# Patient Record
Sex: FEMALE | Race: WHITE | NOT HISPANIC OR LATINO
[De-identification: names, ages, dates, MRNs, and addresses within clinical notes are randomized per-mention and may not be internally consistent; named-entity substitution may affect disease eponyms.]

---

## 2020-05-23 ENCOUNTER — TRANSCRIPTION ENCOUNTER (OUTPATIENT)
Age: 80
End: 2020-05-23

## 2021-01-29 ENCOUNTER — APPOINTMENT (OUTPATIENT)
Dept: ORTHOPEDIC SURGERY | Facility: CLINIC | Age: 81
End: 2021-01-29
Payer: MEDICARE

## 2021-01-29 DIAGNOSIS — Z82.61 FAMILY HISTORY OF ARTHRITIS: ICD-10-CM

## 2021-01-29 DIAGNOSIS — Z87.39 PERSONAL HISTORY OF OTHER DISEASES OF THE MUSCULOSKELETAL SYSTEM AND CONNECTIVE TISSUE: ICD-10-CM

## 2021-01-29 DIAGNOSIS — Z86.69 PERSONAL HISTORY OF OTHER DISEASES OF THE NERVOUS SYSTEM AND SENSE ORGANS: ICD-10-CM

## 2021-01-29 DIAGNOSIS — Z82.62 FAMILY HISTORY OF OSTEOPOROSIS: ICD-10-CM

## 2021-01-29 DIAGNOSIS — Z83.511 FAMILY HISTORY OF GLAUCOMA: ICD-10-CM

## 2021-01-29 DIAGNOSIS — Z86.79 PERSONAL HISTORY OF OTHER DISEASES OF THE CIRCULATORY SYSTEM: ICD-10-CM

## 2021-01-29 DIAGNOSIS — Z86.39 PERSONAL HISTORY OF OTHER ENDOCRINE, NUTRITIONAL AND METABOLIC DISEASE: ICD-10-CM

## 2021-01-29 DIAGNOSIS — Z80.9 FAMILY HISTORY OF MALIGNANT NEOPLASM, UNSPECIFIED: ICD-10-CM

## 2021-01-29 PROCEDURE — 99204 OFFICE O/P NEW MOD 45 MIN: CPT

## 2021-01-29 PROCEDURE — 73030 X-RAY EXAM OF SHOULDER: CPT | Mod: 50

## 2021-01-29 RX ORDER — TIMOLOL MALEATE 2.5 MG/ML
0.25 SOLUTION OPHTHALMIC
Refills: 0 | Status: ACTIVE | COMMUNITY

## 2021-01-29 RX ORDER — TAFLUPROST 0.01 MG/ML
SOLUTION/ DROPS OPHTHALMIC
Refills: 0 | Status: ACTIVE | COMMUNITY

## 2021-01-29 RX ORDER — LOSARTAN POTASSIUM 25 MG/1
25 TABLET, FILM COATED ORAL
Refills: 0 | Status: ACTIVE | COMMUNITY

## 2021-01-29 RX ORDER — THYROID, PORCINE 120 MG/1
120 TABLET ORAL
Refills: 0 | Status: ACTIVE | COMMUNITY

## 2021-01-29 NOTE — REASON FOR VISIT
[Initial Visit] : an initial visit for [Shoulder Pain] : shoulder pain [Spouse] : spouse [Family Member] : family member

## 2021-02-04 NOTE — CONSULT LETTER
[Dear  ___] : Dear  [unfilled], [Courtesy Letter:] : I had the pleasure of seeing your patient, [unfilled], in my office today. [Please see my note below.] : Please see my note below. [Consult Closing:] : Thank you very much for allowing me to participate in the care of this patient.  If you have any questions, please do not hesitate to contact me. [Sincerely,] : Sincerely, [FreeTextEntry2] : German Ross MD [FreeTextEntry3] : Yeny Guzman MD\par Orthopedic Surgery\par Sports Medicine\par
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
## 2021-02-04 NOTE — ASSESSMENT
[FreeTextEntry1] : 80-year-old woman who has had issues with her shoulders for many years now associated with various injuries with chronic pain. She does have stiffness and limited motion of the shoulder but strength is relatively good. There is a complete Tear is seen on an MRI 5 years ago and a LEFT shoulder rotator cuff and partial tears on the RIGHT which may have progressed since that time.\par The LEFT shoulder does have a little more degenerative changes and more narrowing of the subacromial space/elevation of the humeral head.\par She would prefer not to have surgery and with his chronic tears of the rotator cuff may not be repairable at this point in time on the LEFT shoulder. We would need new MRIs to evaluate the tendons and muscles for any atrophy\par at this time she will try more physical therapy which seems to be helping and do home exercises working on the motion and strengthening. He didn't ice as needed. Tylenol and ibuprofen if needed for pain. If she has more pain we could consider a steroid injection. The downside of steroids is that it may weaken the tendons and make them more prone to tearing but can get good relief.\par Over time she may be more prone to the rotator cuff arthropathy and degeneration in the shoulders particularly on the LEFT where she's had a more chronic full-thickness tear.\par Some patience will function very well despite these types of tears.\par followup after doing more therapy and as needed.

## 2021-02-04 NOTE — PHYSICAL EXAM
[Slightly Antalgic] : slightly antalgic [UE] : Sensory: Intact in bilateral upper extremities [Rad] : radial 2+ and symmetric bilaterally [Normal RUE] : Right Upper Extremity: No scars, rashes, lesions, ulcers, skin intact [Normal LUE] : Left Upper Extremity: No scars, rashes, lesions, ulcers, skin intact [Normal Touch] : sensation intact for touch [Normal] : Oriented to person, place, and time, insight and judgement were intact and the affect was normal [de-identified] : Shoulders\par No edema, ecchymoses , erythema, deformity\par AROM: 140 FF Right and 145 left, 45 ER, IR L 4\par + Vaishnavi Mane bilaterally\par Motor: 4/5 supraspinatus, 4+/ 5 ER, 5-/5 IR\par Intact elbow ROM\par NVI distally [de-identified] : No respiratory distress or cough [de-identified] : \par MRI of the LEFT shoulder taken November 2015 showed severely retracted full thickness tear of the posterior supraspinatus and moderate grade partial tearing of the anterior infraspinatus tendons. Moderate tendinosis. Tear of the superior labrum. Mild long head biceps tendinosis with a small longitudinal split and mild a.c. joint arthrosis and a low-lying acromion and small subacromial spurs.\par \par MRI of the RIGHT shoulder Taken in April 2015 showed a high-grade partial bursal tear of the supraspinatus and moderate tendinosis and a moderate partial tear of the More posterior supraspinatus and partial undersurface tear of the infraspinatus and tendinosis. There is degenerative tearing of the superior labrum and evidence of adhesive capsulitis and mild a.c. joint arthrosis with a mildly downsloping acromion and small lateral spur and moderate bursitis\par \par X-rays of bilateral shoulders AP, Y. lateral and axillary views taken today show some narrowing of the subacromial space of LEFT greater than RIGHT shoulder and probably some mild degenerative changes in the glenohumeral joint and mild elevation of the humeral heads bilaterally. changes consistent with impingement.

## 2021-03-24 PROBLEM — M67.911 TENDINOPATHY OF RIGHT ROTATOR CUFF: Status: ACTIVE | Noted: 2021-01-29

## 2021-03-26 ENCOUNTER — APPOINTMENT (OUTPATIENT)
Dept: ORTHOPEDIC SURGERY | Facility: CLINIC | Age: 81
End: 2021-03-26
Payer: MEDICARE

## 2021-03-26 DIAGNOSIS — M67.911 UNSPECIFIED DISORDER OF SYNOVIUM AND TENDON, RIGHT SHOULDER: ICD-10-CM

## 2021-03-26 DIAGNOSIS — R20.2 PARESTHESIA OF SKIN: ICD-10-CM

## 2021-03-26 PROCEDURE — 99214 OFFICE O/P EST MOD 30 MIN: CPT

## 2021-03-26 NOTE — ASSESSMENT
[FreeTextEntry1] : 80-year-old woman who has had issues with her shoulders for many years now associated with various injuries with chronic pain.  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  In the right shoulder she had a high-grade partial tear 6 years ago which likely has progressed to a full-thickness tear.\par Her shoulders are little better after doing the therapy and exercises.  She will continue doing exercises on her own to see if more strengthening is helpful but she should avoid any exercises that aggravate the shoulder pain or cause more of the numbness and tingling that she has been experiencing down her left arm recently.  She can use heat and ice as needed.\par Lidocaine patch.  If she has ongoing numbness and tingling I may want to refer her to a neurologist for nerve studies.\par We may consider corticosteroid injections but because of the possible detrimental effect on the tendons and cartilage I would try to keep it to a minimum.\par Follow-up in about 6 to 8 weeks to check on her progress.\par

## 2021-03-26 NOTE — HISTORY OF PRESENT ILLNESS
[de-identified] : Ms. Moncada is an 81 yo woman LHD who comes in for f/u for chronic bilateral shoulder pain.\par She went to PT 13-14 visits.\par She worked  on the shoulders and developed pain radiating pain down the left arm with numbness and tingling in the fingers. The numbess has decreased but still has numbness in the thenar eminence. \par The osteopath who was treating her thought she may have some thoracic outlet syndrome.\par She does have some stiffness in the neck.\par Her left shoulder is worse than the right with moderate pain aggravated by movements.  There is stiffness and loss of motion.\par She would prefer to avoid surgical treatment on her shoulders.

## 2021-03-26 NOTE — PHYSICAL EXAM
[Slightly Antalgic] : slightly antalgic [UE] : Sensory: Intact in bilateral upper extremities [Rad] : radial 2+ and symmetric bilaterally [Normal RUE] : Right Upper Extremity: No scars, rashes, lesions, ulcers, skin intact [Normal LUE] : Left Upper Extremity: No scars, rashes, lesions, ulcers, skin intact [Normal Touch] : sensation intact for touch [Normal] : Oriented to person, place, and time, insight and judgement were intact and the affect was normal [de-identified] : Shoulders\par No edema, ecchymoses , erythema, deformity\par AROM: 140 FF Right and 130 left, 45 ER, IR L 4\par + Neer Riggins bilaterally\par Motor: 4/5 supraspinatus, 4+/ 5 ER, 5-/5 IR\par Intact elbow ROM\par NVI distally\par Left hand is with atrophy in the thenar eminence.  Degenerative changes at the thumb CMC joint.  Mildly decreased sensation just over the palm of the hand.  Negative Tinel's at the carpal tunnel.  Intact flexion and extension of the fingers.  Hand is warm with normal capillary refill. [de-identified] : No respiratory distress or cough [de-identified] : \par MRI of the LEFT shoulder taken November 2015 showed severely retracted full thickness tear of the posterior supraspinatus and moderate grade partial tearing of the anterior infraspinatus tendons. Moderate tendinosis. Tear of the superior labrum. Mild long head biceps tendinosis with a small longitudinal split and mild a.c. joint arthrosis and a low-lying acromion and small subacromial spurs.\par \par MRI of the RIGHT shoulder Taken in April 2015 showed a high-grade partial bursal tear of the supraspinatus and moderate tendinosis and a moderate partial tear of the More posterior supraspinatus and partial undersurface tear of the infraspinatus and tendinosis. There is degenerative tearing of the superior labrum and evidence of adhesive capsulitis and mild a.c. joint arthrosis with a mildly downsloping acromion and small lateral spur and moderate bursitis\par \par X-rays of bilateral shoulders AP, Y. lateral and axillary views taken 1/29 showed narrowing of the subacromial space of LEFT greater than RIGHT shoulder and probably some mild degenerative changes in the glenohumeral joint and mild elevation of the humeral heads bilaterally. changes consistent with impingement.

## 2021-05-10 ENCOUNTER — APPOINTMENT (OUTPATIENT)
Dept: ORTHOPEDIC SURGERY | Facility: CLINIC | Age: 81
End: 2021-05-10
Payer: MEDICARE

## 2021-05-10 DIAGNOSIS — R22.31 LOCALIZED SWELLING, MASS AND LUMP, RIGHT UPPER LIMB: ICD-10-CM

## 2021-05-10 DIAGNOSIS — D17.22 BENIGN LIPOMATOUS NEOPLASM OF SKIN AND SUBCUTANEOUS TISSUE OF LEFT ARM: ICD-10-CM

## 2021-05-10 DIAGNOSIS — M21.41 FLAT FOOT [PES PLANUS] (ACQUIRED), RIGHT FOOT: ICD-10-CM

## 2021-05-10 DIAGNOSIS — M19.171 POST-TRAUMATIC OSTEOARTHRITIS, RIGHT ANKLE AND FOOT: ICD-10-CM

## 2021-05-10 PROCEDURE — 73090 X-RAY EXAM OF FOREARM: CPT | Mod: 50

## 2021-05-10 PROCEDURE — 99214 OFFICE O/P EST MOD 30 MIN: CPT

## 2021-05-10 PROCEDURE — 73630 X-RAY EXAM OF FOOT: CPT | Mod: RT

## 2021-05-10 PROCEDURE — 73610 X-RAY EXAM OF ANKLE: CPT | Mod: RT

## 2021-05-10 NOTE — PHYSICAL EXAM
[Slightly Antalgic] : slightly antalgic [UE] : Sensory: Intact in bilateral upper extremities [Rad] : radial 2+ and symmetric bilaterally [Normal RUE] : Right Upper Extremity: No scars, rashes, lesions, ulcers, skin intact [Normal LUE] : Left Upper Extremity: No scars, rashes, lesions, ulcers, skin intact [Normal Touch] : sensation intact for touch [Normal] : Oriented to person, place, and time, insight and judgement were intact and the affect was normal [de-identified] : Right foot and ankle\par Hyperpronation feet.\par Very tender at the second and third tarsometatarsal joints and naviculocuneiform joint and tender in the sinus Tarsi.\par Ankle range of motion is with about 5 degrees dorsiflexion and 25 to 30 degrees plantarflexion.  Subtalar motion is intact.\par Good strength anterior tibial tendon, gastrocsoleus, peroneals, posterior tibial tendon and EHL.\par No edema, ecchymosis, erythema\par \par Bilateral forearms\par Visible and palpable soft mobile nontender mass in the left forearm at the junction of the mid to distal third that measures about 2 to 3 cm and feels consistent with a lipoma.\par Intact elbow and wrist and finger range of motion.\par Right forearm is without any visible masses but on palpation of the dorsal forearm there are some firm ridges and a small palpable masses that do not feel consistent with a lipoma.  No tenderness.\par Motor and sensation are intact throughout.\par \par Shoulders\par No edema, ecchymoses , erythema, deformity\par AROM: 140 FF Right and 130 left, 45 ER, IR L 4\par + Neer, Riggins bilaterally\par Motor: 4/5 supraspinatus, 4+/ 5 ER, 5-/5 IR\par Intact elbow ROM\par NVI distally\par Left hand is with atrophy in the thenar eminence.  Degenerative changes at the thumb CMC joint.  Mildly decreased sensation just over the palm of the hand.  Negative Tinel's at the carpal tunnel.  Intact flexion and extension of the fingers.  Hand is warm with normal capillary refill. [de-identified] : No respiratory distress or cough [de-identified] : \par x-rays right foot and ankle WB 5 views today show severe degenerative changes/narrowing and sclerosis at the 2nd and 3rd TMT joints and degeneration at the navicular-medial cuneiform joints. Sagging of the arch TN, NC and TMT joints.\par \par X-rays of the left forearm AP and lateral views show homogenous subcutaneous mass that looks the same as subcutaneous fat.\par X-rays of the right forearm AP and lateral views show no distinct masses.  There are some benign-appearing calcifications similar to the left forearm.\par \par MRI of the LEFT shoulder taken November 2015 showed severely retracted full thickness tear of the posterior supraspinatus and moderate grade partial tearing of the anterior infraspinatus tendons. Moderate tendinosis. Tear of the superior labrum. Mild long head biceps tendinosis with a small longitudinal split and mild a.c. joint arthrosis and a low-lying acromion and small subacromial spurs.\par \par MRI of the RIGHT shoulder Taken in April 2015 showed a high-grade partial bursal tear of the supraspinatus and moderate tendinosis and a moderate partial tear of the More posterior supraspinatus and partial undersurface tear of the infraspinatus and tendinosis. There is degenerative tearing of the superior labrum and evidence of adhesive capsulitis and mild a.c. joint arthrosis with a mildly downsloping acromion and small lateral spur and moderate bursitis\par \par X-rays of bilateral shoulders AP, Y. lateral and axillary views taken 1/29 showed narrowing of the subacromial space of LEFT greater than RIGHT shoulder and probably some mild degenerative changes in the glenohumeral joint and mild elevation of the humeral heads bilaterally. changes consistent with impingement.

## 2021-05-10 NOTE — REASON FOR VISIT
[Follow-Up Visit] : a follow-up visit for [FreeTextEntry2] : Right foot and ankle pain. masses bilateral forearms.
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
## 2021-05-10 NOTE — ASSESSMENT
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  For this I recommended wearing good supportive shoes which she has in the right arch support.  Warm soaks and ice as needed.  Voltaren gel.\par Ultimate treatment would be a fusion of the affected joints which I do not think she would consider doing unless she had severe pain and worsening function.\par In her left forearm there is a soft mass measuring about 2 to 3 cm that feels like a subcutaneous lipoma that she has had for many years.  We will watch it and if it starts to get larger or more symptomatic than excision is an option.\par In her right forearm she has smaller bumps along the dorsal forearm of unclear significance.  It does feel unusual and I gave her a prescription for an MRI.\par \par She has chronic massive tear on the left shoulder retracted is getting a rotator cuff arthropathy.  In the right shoulder she had a high-grade partial tear 6 years ago which likely has progressed to a full-thickness tear.\par \par Follow-up in about 2 mos to check on her progress but I will call her when I get the MRI results on the right forearm.\par

## 2021-11-11 ENCOUNTER — APPOINTMENT (OUTPATIENT)
Dept: ORTHOPEDIC SURGERY | Facility: CLINIC | Age: 81
End: 2021-11-11
Payer: MEDICARE

## 2021-11-11 DIAGNOSIS — M67.912 UNSPECIFIED DISORDER OF SYNOVIUM AND TENDON, LEFT SHOULDER: ICD-10-CM

## 2021-11-11 DIAGNOSIS — M47.22 OTHER SPONDYLOSIS WITH RADICULOPATHY, CERVICAL REGION: ICD-10-CM

## 2021-11-11 PROCEDURE — 72200 X-RAY EXAM SI JOINTS: CPT

## 2021-11-11 PROCEDURE — 72050 X-RAY EXAM NECK SPINE 4/5VWS: CPT

## 2021-11-11 PROCEDURE — 71100 X-RAY EXAM RIBS UNI 2 VIEWS: CPT | Mod: LT

## 2021-11-11 RX ORDER — LIDOCAINE 5% 700 MG/1
5 PATCH TOPICAL
Qty: 30 | Refills: 1 | Status: ACTIVE | COMMUNITY
Start: 2021-11-11 | End: 1900-01-01

## 2021-11-11 NOTE — ASSESSMENT
[FreeTextEntry1] : 81-year-old woman who was having frequent falls which is a significant problem in and of itself but had 2 falls recently leading to pain in the left chest wall and likely a fracture of one of the left lower ribs although no fractures seen on x-ray.  She also fell last week and likely has a coccyx fracture.\par For these fractures I recommended symptomatic treatment avoiding activities or positions that are painful.  She can take 2 Tylenol with 2 Advil 2-3 times a day as needed for pain.  Heat and ice as needed.  Lidocaine patch.\par She should be very careful when walking and be alert and make sure she is seeing well so she does not have any more falls.\par The fracture should heal in the next 4 to 6 weeks.\par She also has neck pain where there is multilevel cervical spondylosis.  She has had a history of neck and shoulder issues.\par She had bad pain last night which just started up.  She should use heat and ice.  She will be taking the Tylenol and ibuprofen which hopefully will help.\par She should see me back in about 4 weeks.  If she has ongoing neck pain we may get an MRI or I may refer her to pain management.\par

## 2021-11-11 NOTE — REASON FOR VISIT
[Follow-Up Visit] : a follow-up visit for [Spouse] : spouse [FreeTextEntry2] : Neck, rib and coccyx pain

## 2021-11-11 NOTE — HISTORY OF PRESENT ILLNESS
[de-identified] : 81-year-old comes in today for a new issue.  She is having pain from 2 different falls.  She fell on October 21 forward and hit the left side and had rib pain afterwards.  It was getting progressively better but then [place] onto her coccyx/buttocks and then after that had increased pain again in the ribs.  The last night she was sitting and had severe neck pain on the left side that was radiating into the left arm and also to the left chest.  Prior to this event her left shoulder had been feeling much better since I last saw her.  She took 2 Advil last night but nothing today and the pain is not as bad in the neck but she still has the pain in the ribs.\par The rib pain is worse coughing.  She has a chronic cough for years and may have MINGO.  She is going for pulmonary testing.\par It hurts sleeping on her sides and to take a deep breath.\par The coccyx can hurt sitting, getting up from a chair, climbing stairs or leaning on anything where there is pressure..  She used to have a donut pillow but does not have it.\par She states that she had been very depressed.  She had come off of an antidepressant before the first fall.  Her vision also is not good and so this is all affecting her balance and making her at a higher risk of falling.  She did not blackout or have a syncopal episode.\par She also had lost 10 pounds and her appetite has not been as good.

## 2021-11-11 NOTE — PHYSICAL EXAM
[Slightly Antalgic] : slightly antalgic [UE] : Sensory: Intact in bilateral upper extremities [Rad] : radial 2+ and symmetric bilaterally [Normal RUE] : Right Upper Extremity: No scars, rashes, lesions, ulcers, skin intact [Normal LUE] : Left Upper Extremity: No scars, rashes, lesions, ulcers, skin intact [Normal Touch] : sensation intact for touch [Normal] : Oriented to person, place, and time, insight and judgement were intact and the affect was normal [de-identified] : Chest wall\par She has tenderness in the left ribs both proximally but greatest in the lower lateral ribs in the axillary line.  No crepitus or step-off on the ribs but there is some tenderness that could be consistent with a bone bruise or fracture.\par It is painful for her to cough.  Changing position and lying down causes severe left chest wall pain\par \par Low back \par Very tender on the coccyx just distal to an angle in the coccyx centrally.  No significant tenderness mid lumbar spine.\par She is walking without an antalgic gait but has mild pain.\par \par Cervical spine and left shoulder\par Mild tenderness in the left cervical spine but no severe tenderness.\par Decreased range of motion with mild pain but no severe pain.\par Left shoulder active forward elevation to 150 degrees and internal rotation to L1.\par Motor is intact with mild pain and weakness. [de-identified] : No respiratory distress or cough [de-identified] : \par X-rays cervical spine show loss of lordosis and multilevel degenerative changes with multilevel C3-4, C4-5 and C5-6 spondylolisthesis.  Severe degenerative disc disease at C6-7 with foraminal narrowing on the oblique views.\par \par X-rays of the left ribs show multilevel degenerative disc disease in the spine.  No fractures seen.  Calcification of the cartilage.\par \par X-rays of the coccyx AP and lateral views show angulation of the coccyx which may be a fracture but no definite fracture line.

## 2021-11-18 RX ORDER — TRAMADOL HYDROCHLORIDE 50 MG/1
50 TABLET, COATED ORAL
Qty: 15 | Refills: 0 | Status: ACTIVE | COMMUNITY
Start: 2021-11-18 | End: 1900-01-01

## 2021-12-09 ENCOUNTER — APPOINTMENT (OUTPATIENT)
Dept: ORTHOPEDIC SURGERY | Facility: CLINIC | Age: 81
End: 2021-12-09

## 2022-02-17 ENCOUNTER — APPOINTMENT (OUTPATIENT)
Dept: ORTHOPEDIC SURGERY | Facility: CLINIC | Age: 82
End: 2022-02-17
Payer: MEDICARE

## 2022-02-17 DIAGNOSIS — R20.2 PARESTHESIA OF SKIN: ICD-10-CM

## 2022-02-17 PROCEDURE — 99214 OFFICE O/P EST MOD 30 MIN: CPT

## 2022-02-17 NOTE — PHYSICAL EXAM
[Slightly Antalgic] : slightly antalgic [UE] : Sensory: Intact in bilateral upper extremities [Rad] : radial 2+ and symmetric bilaterally [Normal RUE] : Right Upper Extremity: No scars, rashes, lesions, ulcers, skin intact [Normal LUE] : Left Upper Extremity: No scars, rashes, lesions, ulcers, skin intact [Normal Touch] : sensation intact for touch [Normal] : Oriented to person, place, and time, insight and judgement were intact and the affect was normal [de-identified] : Chest wall\par She has mild tenderness in the left anterior lower ribs.  No crepitus or step-off on the ribs but there is some tenderness that could be consistent with a bone bruise or fracture.\par It is painful for her to cough.  Less pain changing position.\par She is breathing comfortably.\par \par Low back \par tender on the coccyx just distal to an angle in the coccyx centrally.  Less tender than last visit.  No significant tenderness mid lumbar spine.\par She is walking without an antalgic gait but has mild pain.\par Her balance appears more compromised.\par Mild ankle edema.  Generalized tenderness on the tibia and ankles and feet but no pain there when walking.\par \par Cervical spine and left shoulder\par Mild tenderness in the left cervical spine but no severe tenderness.\par Decreased range of motion with mild pain but no severe pain.\par Left shoulder active forward elevation to 150 degrees and internal rotation to L1.\par Motor is intact with mild pain and weakness. [de-identified] : No respiratory distress or cough [de-identified] : \par November 11, 2021\par X-rays cervical spine show loss of lordosis and multilevel degenerative changes with multilevel C3-4, C4-5 and C5-6 spondylolisthesis.  Severe degenerative disc disease at C6-7 with foraminal narrowing on the oblique views.\par X-rays of the left ribs show multilevel degenerative disc disease in the spine.  No fractures seen.  Calcification of the cartilage.\par X-rays of the coccyx AP and lateral views show angulation of the coccyx which may be a fracture but no definite fracture line.\par \par CT scan of the pelvis performed December 14, 2021 showed acute angulation at the sacrococcygeal angle consistent with an acute injury.  Lumbar multilevel spondylosis.

## 2022-02-17 NOTE — ASSESSMENT
[FreeTextEntry1] : 81-year-old woman who was having frequent falls October 2021 which led to rib pain and then she had coccyx pain consistent with fractures.  She then became quite ill and was in eating and lost a lot of weight and developed a lot of swelling in the legs and fluid on her lungs and was hospitalized.  Fortunately she is doing better now and eating and starting to put on weight.  She still has some rib pain and coccyx pain.  These areas are typically treated symptomatically and usually will resolve over time.  Her getting sick and the weight loss probably did not help.  She does have a doughnut pillow which she can use.  If she has lung pain she can hug a pillow.\par She can slowly increase activity as tolerated as she is getting back her strength.\par In terms of the paresthesias and numbness and pain in the legs which feels like lumbar radiculopathy which she had in the past we will get a new MRI of her back to see if there is significant nerve compression.  She will try gabapentin which she will take a very low dose at night.  She has taken it in the past and it helped.\par If there is nerve impingement then seeing a pain management specialist may be helpful for an injection.  I will call her with the results of the MRI.  Otherwise follow-up in about 4 to 6 weeks\par

## 2022-02-17 NOTE — HISTORY OF PRESENT ILLNESS
[de-identified] : 81-year-old comes in today for a follow up from a fall in October 2021. Since last visit she spent 10 days hospitalized in December bc she had lost 24 lbs due to not eating/drinking. They found fluid in her lungs.  Fluid was drained.  She had an infection but no other etiology for this.  She was having difficulty swallowing but is working with the speech therapist on her swallowing and is starting to put on weight again.  She is currently back on Augmentin for her lungs that they started again last week thinking maybe there is still some infection present.  She is going to have a repeat CT scan of her chest next month.  She has some rib pain on the left lower anterior ribs and near the sternum that hurts more with deep breathing and coughing.  She is coughing a lot less now than she had been.  She does take 2 Advil before bedtime for various pains.\par \par Her tailbone and low back have continued to bother her. She had a CT scan while in hospital in December.She has pain in tailbone with sitting for long periods of time especially with no cushion.  She has a U-shaped cushion that she is using and helps.  She doesn’t have significant pain with walking. Her low-back pain can be 6-7/10 pain. She notes she gets numbness and tingling down legs. She has a previous history of sciatica. Her legs and ankle hurt.

## 2022-06-07 ENCOUNTER — APPOINTMENT (OUTPATIENT)
Dept: ORTHOPEDIC SURGERY | Facility: AMBULATORY SURGERY CENTER | Age: 82
End: 2022-06-07
Payer: MEDICARE

## 2022-06-07 DIAGNOSIS — S20.212D CONTUSION OF LEFT FRONT WALL OF THORAX, SUBSEQUENT ENCOUNTER: ICD-10-CM

## 2022-06-07 DIAGNOSIS — S32.2XXG: ICD-10-CM

## 2022-06-07 DIAGNOSIS — M47.816 SPONDYLOSIS W/OUT MYELOPATHY OR RADICULOPATHY, LUMBAR REGION: ICD-10-CM

## 2022-06-07 PROCEDURE — 99441: CPT | Mod: 95

## 2022-06-07 RX ORDER — GABAPENTIN 100 MG/1
100 CAPSULE ORAL
Qty: 30 | Refills: 1 | Status: ACTIVE | COMMUNITY
Start: 2022-02-17 | End: 1900-01-01

## 2023-04-14 ENCOUNTER — APPOINTMENT (OUTPATIENT)
Dept: ORTHOPEDIC SURGERY | Facility: CLINIC | Age: 83
End: 2023-04-14
Payer: MEDICARE

## 2023-04-14 PROCEDURE — 99214 OFFICE O/P EST MOD 30 MIN: CPT

## 2023-04-14 PROCEDURE — 73030 X-RAY EXAM OF SHOULDER: CPT | Mod: 50

## 2023-04-14 NOTE — ASSESSMENT
[FreeTextEntry1] : 83-year-old woman with bilateral retracted rotator cuff tears more recent on the right than left.  She had acute pain about 7 weeks ago on the right side and probably tore it to some degree at that time.  In 2015 there was a partial high-grade tear of the supraspinatus which now is completely torn and retracted.  It may have torn further over time and then tore further with this incident.  She compensates relatively well with the tear on the left with good elevation and relatively good strength.  On the right side it is slowly improving and I would give it a few more months to see how good it gets before even thinking about surgery.  These typically can be treated without surgery.  If surgery is done there is a chance that tear cannot be fully reduced in which case other procedures are possible or just a debridement may be done.\par We reviewed home exercises and gave her band.\par I recommended doing extensive physical therapy) strengthen around the shoulder which often can provide satisfactory outcome with function.\par Heat and ice as needed.  Tylenol as needed.\par Follow-up in 6 to 8 weeks.

## 2023-04-14 NOTE — HISTORY OF PRESENT ILLNESS
[de-identified] : Ms Moncada comes in for follow up RIGHT shoulder pain that started 7 weeks ago. She was in [place] and doing a lot of exercises in the water that she thinks may have caused an increase in pain but there was no new injury.  Has not any fall or significant trauma.  She has a history of a high-grade partial tear of her supraspinatus tendon in 2015 on an MRI.  She has also a history of a chronic retracted left rotator cuff tear seen on an MRI in 2015 as well\par She had an MRI a week ago. \par She takes Advil mostly at night  but not daily.\par She is working with a physical therapist.  She has pain with certain movements and weakness in the shoulder.\par

## 2023-04-14 NOTE — REASON FOR VISIT
[Shoulder Injury] : shoulder injury [Follow-Up Visit] : a follow-up visit for [Shoulder Pain] : shoulder pain

## 2023-04-14 NOTE — PHYSICAL EXAM
[Slightly Antalgic] : slightly antalgic [UE] : Sensory: Intact in bilateral upper extremities [Rad] : radial 2+ and symmetric bilaterally [Normal RUE] : Right Upper Extremity: No scars, rashes, lesions, ulcers, skin intact [Normal LUE] : Left Upper Extremity: No scars, rashes, lesions, ulcers, skin intact [Normal Touch] : sensation intact for touch [Normal] : Oriented to person, place, and time, insight and judgement were intact and the affect was normal [de-identified] : \par Bilateral shoulders\par No edema, ecchymoses, erythema.  There is mild fullness left lateral arm near the distal deltoid but no distinct masses.\par Active elevation of the left shoulder is to 170 degrees without significant pain and internal rotation to the level 2.  In the right shoulder there is elevation to about 90 degrees and then she has a painful arc and with very little assistance she can elevate overhead and then pain resolves.  She can lower her arms from overhead down slowly without pain or drop arm.  Internal rotation on the right is also to L1.  Approximately 60 degrees external rotation with the arms at the side.\par Motor is with 3+/5 supraspinatus right and 4/5 left and 4+/5 internal and external rotation bilateral shoulders.  5/5 biceps.\par Pain with Neer and Riggins right greater than left.\par Normal neurovascular exam distally. [de-identified] : No respiratory distress or cough [de-identified] : \par MRI of the right shoulder performed April 7, 2023 showed a complete retracted tear of the supraspinatus and biceps tendon and atrophy in the supraspinatus and subscapularis.  Degenerative changes glenohumeral and AC joint.\par \par MRIs of both shoulders were done in 2015 and reports are in the chart and were reviewed.\par \par X-rays today bilateral shoulders AP, Y lateral and axillary views show well centered humeral head on the right and mildly elevated on the left.  Minimal degenerative changes.  No fractures.  Acromial spur.

## 2023-06-08 ENCOUNTER — APPOINTMENT (OUTPATIENT)
Dept: ORTHOPEDIC SURGERY | Facility: CLINIC | Age: 83
End: 2023-06-08

## 2023-06-13 ENCOUNTER — APPOINTMENT (OUTPATIENT)
Dept: RADIOLOGY | Facility: CLINIC | Age: 83
End: 2023-06-13
Payer: MEDICARE

## 2023-06-13 PROCEDURE — 77080 DXA BONE DENSITY AXIAL: CPT

## 2023-06-21 ENCOUNTER — APPOINTMENT (OUTPATIENT)
Dept: ORTHOPEDIC SURGERY | Facility: CLINIC | Age: 83
End: 2023-06-21
Payer: MEDICARE

## 2023-06-21 DIAGNOSIS — M20.42 OTHER HAMMER TOE(S) (ACQUIRED), LEFT FOOT: ICD-10-CM

## 2023-06-21 DIAGNOSIS — M19.072 PRIMARY OSTEOARTHRITIS, LEFT ANKLE AND FOOT: ICD-10-CM

## 2023-06-21 DIAGNOSIS — M19.071 PRIMARY OSTEOARTHRITIS, RIGHT ANKLE AND FOOT: ICD-10-CM

## 2023-06-21 DIAGNOSIS — M75.122 COMPLETE ROTATOR CUFF TEAR OR RUPTURE OF LEFT SHOULDER, NOT SPECIFIED AS TRAUMATIC: ICD-10-CM

## 2023-06-21 DIAGNOSIS — M75.121 COMPLETE ROTATOR CUFF TEAR OR RUPTURE OF RIGHT SHOULDER, NOT SPECIFIED AS TRAUMATIC: ICD-10-CM

## 2023-06-21 PROCEDURE — 73630 X-RAY EXAM OF FOOT: CPT | Mod: 50

## 2023-06-21 PROCEDURE — 99214 OFFICE O/P EST MOD 30 MIN: CPT

## 2023-06-21 NOTE — HISTORY OF PRESENT ILLNESS
[de-identified] : Ms Moncada comes in for follow up RIGHT shoulder pain which she is feeling better with therapy.  Her left shoulder was feeling a little worse then.  She started with a new physical therapist which she thinks will be very good.  The new therapist did a lot of massage and soft tissue work which was very helpful.  She does some exercises on a roller on her bed and reaching somehow aggravated the left shoulder.  She has had chronic tears of the rotator cuff for about 20 years in the left shoulder.  Reaching and rotating can be painful.  She is not taking any medication for pain.\par She also complains of bilateral foot pain. She localizes pain over lateral foot near the fifth metatarsal tuberosity., her shoes tend to rub over the 5th metatarsal and causes pain. She also has callus pain over bottom of LEFT heel which can be painful and has some pain in her second toe where she is getting hammertoe.  She does wear sneakers that are larger.  No injuries or acute problems.

## 2023-06-21 NOTE — PHYSICAL EXAM
[de-identified] : \par Bilateral shoulders\par No edema, ecchymoses, erythema.  There is mild fullness left lateral arm near the distal deltoid but no distinct masses.\par Active elevation of the left shoulder is to 170 degrees without significant pain and internal rotation to the level 2.  In the right shoulder there is elevation to about 90 degrees and then she has a painful arc and with very little assistance she can elevate overhead and then pain resolves.  She can lower her arms from overhead down slowly without pain or drop arm.  Internal rotation on the right is also to L1.  Approximately 60 degrees external rotation with the arms at the side.\par Motor is with 3+/5 supraspinatus right and 4/5 left and 4+/5 internal and external rotation bilateral shoulders.  5/5 biceps.\par Pain with Neer and Riggins right greater than left.\par Normal neurovascular exam distally. [de-identified] : No respiratory distress or cough [de-identified] : \par MRI of the right shoulder performed April 7, 2023 showed a complete retracted tear of the supraspinatus and biceps tendon and atrophy in the supraspinatus and subscapularis.  Degenerative changes glenohumeral and AC joint.\par \par MRIs of both shoulders were done in 2015 and reports are in the chart and were reviewed.  Rotator cuff tear chronic left shoulder retracted large\par \par X-rays 4/14/23 bilateral shoulders AP, Y lateral and axillary views show well centered humeral head on the right and mildly elevated on the left.  Minimal degenerative changes.  No fractures.  Acromial spur.\par \par X-rays taken today of bilateral feet weightbearing 3 views showed midfoot tarsometatarsal osteoarthritis bilaterally.  Tiny calcifications adjacent to the base of the fifth metatarsal mild hammertoes.  No fractures or acute changes.  No spurs.

## 2023-06-21 NOTE — ASSESSMENT
[FreeTextEntry1] : 83-year-old woman with bilateral retracted rotator cuff tears more recent on the right than left.  There is likely mild rotator cuff arthropathy on the left side.\par She had some aggravation of pain recently.  I recommended continuing with the physical therapy which seems to be helping with the new therapist.  We could do a steroid injection if pain persists.  She has relatively good motion and good strength in the shoulders and very functional.  Surgery would not be recommended at this point in time.  If pain persists on the left then we can do an injection.\par For her feet I recommended wide supportive shoes with good cushion.  She should avoid shoes that press on the fifth metatarsal tuberosity are on the hammertoes.  She could try a hammertoe cushion.  For the heel she could use a gel heel cup.  She has very thin fat pad so there is not a lot of cushion in her feet.  There is significant midfoot arthritis that is minimally symptomatic as well.  Steroid injection may be considered if pain worsens.  \par We will follow-up in about 2 to 3 months.

## 2023-11-16 ENCOUNTER — APPOINTMENT (OUTPATIENT)
Dept: ULTRASOUND IMAGING | Facility: CLINIC | Age: 83
End: 2023-11-16
Payer: MEDICARE

## 2023-11-16 ENCOUNTER — APPOINTMENT (OUTPATIENT)
Dept: MAMMOGRAPHY | Facility: CLINIC | Age: 83
End: 2023-11-16
Payer: MEDICARE

## 2023-11-16 PROCEDURE — 77063 BREAST TOMOSYNTHESIS BI: CPT

## 2023-11-16 PROCEDURE — 76830 TRANSVAGINAL US NON-OB: CPT

## 2023-11-16 PROCEDURE — 77067 SCR MAMMO BI INCL CAD: CPT

## 2024-12-11 ENCOUNTER — APPOINTMENT (OUTPATIENT)
Dept: ULTRASOUND IMAGING | Facility: CLINIC | Age: 84
End: 2024-12-11
Payer: MEDICARE

## 2024-12-11 ENCOUNTER — APPOINTMENT (OUTPATIENT)
Dept: MAMMOGRAPHY | Facility: CLINIC | Age: 84
End: 2024-12-11
Payer: MEDICARE

## 2024-12-11 ENCOUNTER — APPOINTMENT (OUTPATIENT)
Dept: RADIOLOGY | Facility: CLINIC | Age: 84
End: 2024-12-11
Payer: MEDICARE

## 2024-12-11 PROCEDURE — 77080 DXA BONE DENSITY AXIAL: CPT

## 2024-12-11 PROCEDURE — 77063 BREAST TOMOSYNTHESIS BI: CPT

## 2024-12-11 PROCEDURE — 76856 US EXAM PELVIC COMPLETE: CPT

## 2024-12-11 PROCEDURE — 77067 SCR MAMMO BI INCL CAD: CPT

## 2024-12-11 PROCEDURE — 76830 TRANSVAGINAL US NON-OB: CPT

## 2025-01-06 ENCOUNTER — APPOINTMENT (OUTPATIENT)
Dept: ORTHOPEDIC SURGERY | Facility: CLINIC | Age: 85
End: 2025-01-06
Payer: MEDICARE
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
## 2025-01-06 DIAGNOSIS — M25.512 PAIN IN LEFT SHOULDER: ICD-10-CM

## 2025-01-06 DIAGNOSIS — Z78.9 OTHER SPECIFIED HEALTH STATUS: ICD-10-CM

## 2025-01-06 PROCEDURE — 73110 X-RAY EXAM OF WRIST: CPT | Mod: LT

## 2025-01-06 PROCEDURE — 25605 CLTX DST RDL FX/EPHYS SEP W/: CPT | Mod: LT

## 2025-01-06 PROCEDURE — 99212 OFFICE O/P EST SF 10 MIN: CPT | Mod: 25

## 2025-01-06 PROCEDURE — 73030 X-RAY EXAM OF SHOULDER: CPT | Mod: LT

## 2025-01-10 ENCOUNTER — APPOINTMENT (OUTPATIENT)
Dept: ORTHOPEDIC SURGERY | Facility: CLINIC | Age: 85
End: 2025-01-10
Payer: MEDICARE

## 2025-01-10 VITALS — RESPIRATION RATE: 16 BRPM | WEIGHT: 115 LBS | BODY MASS INDEX: 20.38 KG/M2 | HEIGHT: 63 IN

## 2025-01-10 DIAGNOSIS — S52.532A COLLES' FRACTURE OF LEFT RADIUS, INITIAL ENCOUNTER FOR CLOSED FRACTURE: ICD-10-CM

## 2025-01-10 PROCEDURE — 73110 X-RAY EXAM OF WRIST: CPT | Mod: 50

## 2025-01-10 PROCEDURE — 99204 OFFICE O/P NEW MOD 45 MIN: CPT | Mod: 57

## 2025-01-14 RX ORDER — CEPHALEXIN 500 MG/1
500 CAPSULE ORAL 3 TIMES DAILY
Qty: 21 | Refills: 0 | Status: ACTIVE | COMMUNITY
Start: 2025-01-14 | End: 1900-01-01

## 2025-01-14 RX ORDER — HYDROCODONE BITARTRATE AND ACETAMINOPHEN 5; 325 MG/1; MG/1
5-325 TABLET ORAL
Qty: 20 | Refills: 0 | Status: ACTIVE | COMMUNITY
Start: 2025-01-14 | End: 1900-01-01

## 2025-01-17 ENCOUNTER — TRANSCRIPTION ENCOUNTER (OUTPATIENT)
Age: 85
End: 2025-01-17

## 2025-01-17 NOTE — ASU DISCHARGE PLAN (ADULT/PEDIATRIC) - NS MD DC FALL RISK RISK
For information on Fall & Injury Prevention, visit: [email or web address] OR  [email or web address] OR  https://www.cdc.gov/steadi/patient.html

## 2025-01-17 NOTE — ASU DISCHARGE PLAN (ADULT/PEDIATRIC) - FINANCIAL ASSISTANCE
[facility] provides services at a reduced cost to those who are determined to be eligible through [facility]’s financial assistance program. Information regarding [facility]’s financial assistance program can be found by going to [email or web address] or by calling [phone].

## 2025-01-17 NOTE — ASU PATIENT PROFILE, ADULT - NS PREOP UNDERSTANDS INFO
No food or drink after midnight, bring photo id and insurance card.  As per pt time was given by MD/yes

## 2025-01-17 NOTE — ASU PATIENT PROFILE, ADULT - FALL HARM RISK - HARM RISK INTERVENTIONS

## 2025-01-17 NOTE — ASU PATIENT PROFILE, ADULT - NSICDXPASTMEDICALHX_GEN_ALL_CORE_FT
PAST MEDICAL HISTORY:  Anxiety     CAD (coronary artery disease)     GERD (gastroesophageal reflux disease)     Glaucoma     HTN (hypertension)     Hypothyroidism     Legally blind      PAST MEDICAL HISTORY:  Anxiety     Basal cell carcinoma     CAD (coronary artery disease)     GERD (gastroesophageal reflux disease)     Glaucoma     HTN (hypertension)     Hypothyroidism     Injury of coccyx 2021 s/p fall    Legally blind

## 2025-01-17 NOTE — ASU DISCHARGE PLAN (ADULT/PEDIATRIC) - ASU DC SPECIAL INSTRUCTIONSFT
Co-Directors: Nadir Child MD; Jose Cruz Mahajan MD; Benjie Quinonez MD   [facility] [address] 	  [place] [address] 	 	  Phone [phone] (HAND), Fax [phone]    [email or web address]    Hand Surgery Post Operative Instructions      DRESSING CARE:  1.	Please keep bandage ON and DRY until you return to the office for your 1st postoperative visit.   2.	In the shower you must cover bandage with a plastic bag. You can use tape or a rubber band so no water leaks into the bag.   3.	Please do not exercise as that leads to excessive sweating as the bandage will therefore become moist/ wet.    4.	Do not remove or change your bandage. You may apply more tape if dressing starts to unravel.   5.	Please do not insert any objects, such as a pencil, down into the bandage.     ELEVATION:  1.	Keep hand/wrist above heart level at all times or until bandage feels loose. This will help with swelling of the fingers and can be accomplished by using the FOAM PILLOW.   2.	 A sling will not hold your hand/wrist above your heart and therefore its use should be limited (it may also cause shoulder stiffness).     ACTIVITY:  1.	Moving your fingers daily after surgery is very important to prevent stiffness. Please open your fingers completely and close your fingers completely to achieve full range of motion.  **UNLESS TENDON REPAIR OR NERVE REPAIR SURGERY PERFORMED    2.	Move all joints of the extremity that are not immobilized to prevent stiffness (i.e. shoulder, elbow, fingers, and thumb unless instructed otherwise).   3.	Avoid activities which may re-injure your hand or finger.     DIET:   Regular diet. Start light and progress as tolerated.     PAIN MEDICINE:   1.	Pain medicine was sent to your pharmacy.  2.	Take pain medicine on an “AS NEEDED” basis according to your doctor’s instructions.   3.	Your pain will decrease over the next few days allowing you to:   •	Decrease your pain medicine quantity until you stop.   •	Increase the time between doses until you stop.   4.	You should not drink alcoholic beverages while on pain medication.   5.	Take pain medicine with food to prevent nausea.   6.	Constipation can occur. If no bowel movement occurs within 48 hours take a laxative of your choice (over the counter).	 	      CONTACT PHYSICIAN FOR:   Slight pain, swelling and bluish discoloration are to be expected. If you have breathing difficulty or chest pain dial 911 immediately. However, if the following symptoms occur notify your physician:   •	Temperature above 101° F 	        • Inability to urinate in 8 hours   •	Uncontrolled nausea/vomiting   	• Progressively increasing pain   •	Signs of wound infection 	                • Excessive bleeding  (Redness, swelling, pus-like drainage)     • Increasing numbness   •	Excessive swelling and tightness 	• Splint or cast that is too tight      OFFICE APPOINTMENT:    A staff member from the office will call you in the next 1-2 days to schedule your 1st Post Operative appointment to see your physician back in the office. *IF someone does not reach out to you in the next 1-2 days please call the office.      Patient Name _________________________________ Signature ______________________________ Date__________    Witness _____________________________________________________ Date ______________

## 2025-01-17 NOTE — BRIEF OPERATIVE NOTE - NSICDXBRIEFPOSTOP_GEN_ALL_CORE_FT
POST-OP DIAGNOSIS:  Displaced fracture of distal end of left radius 17-Jan-2025 08:46:56  Pedro Merino

## 2025-01-17 NOTE — ASU PATIENT PROFILE, ADULT - NSICDXPASTSURGICALHX_GEN_ALL_CORE_FT
PAST SURGICAL HISTORY:  History of surgery Glaucoma    History of tonsillectomy     Total cataract      PAST SURGICAL HISTORY:  H/O basal cell carcinoma excision left le    History of surgery Glaucoma    History of tonsillectomy     Total cataract

## 2025-01-17 NOTE — ASU PATIENT PROFILE, ADULT - ABILITY TO HEAR (WITH HEARING AID OR HEARING APPLIANCE IF NORMALLY USED):
Mildly to Moderately Impaired: difficulty hearing in some environments or speaker may need to increase volume or speak distinctly [place]/Mildly to Moderately Impaired: difficulty hearing in some environments or speaker may need to increase volume or speak distinctly

## 2025-01-17 NOTE — ASU DISCHARGE PLAN (ADULT/PEDIATRIC) - CARE PROVIDER_API CALL
Nadir Child  Surgery of [facility] Hand  [address]  [place] [address]  Phone: [phone]  Fax: [phone]  Established Patient  Follow Up Time:

## 2025-01-17 NOTE — BRIEF OPERATIVE NOTE - NSICDXBRIEFPREOP_GEN_ALL_CORE_FT
PRE-OP DIAGNOSIS:  Displaced fracture of distal end of left radius 17-Jan-2025 08:46:40  Pedro Merino

## 2025-01-17 NOTE — ASU PATIENT PROFILE, ADULT - VISION (WITH CORRECTIVE LENSES IF THE PATIENT USUALLY WEARS THEM):
Legally blind/Partially impaired: cannot see medication labels or newsprint, but can see obstacles in path, and the surrounding layout; can count fingers at arm's length Legally blind/glasses/Partially impaired: cannot see medication labels or newsprint, but can see obstacles in path, and the surrounding layout; can count fingers at arm's length

## 2025-01-20 ENCOUNTER — TRANSCRIPTION ENCOUNTER (OUTPATIENT)
Age: 85
End: 2025-01-20

## 2025-01-21 ENCOUNTER — OUTPATIENT (OUTPATIENT)
Dept: OUTPATIENT SERVICES | Facility: HOSPITAL | Age: 85
LOS: 1 days | Discharge: HOME | End: 2025-01-21

## 2025-01-21 ENCOUNTER — APPOINTMENT (OUTPATIENT)
Dept: ORTHOPEDIC SURGERY | Facility: AMBULATORY SURGERY CENTER | Age: 85
End: 2025-01-21

## 2025-01-21 ENCOUNTER — TRANSCRIPTION ENCOUNTER (OUTPATIENT)
Age: 85
End: 2025-01-21

## 2025-01-21 VITALS
DIASTOLIC BLOOD PRESSURE: 68 MMHG | SYSTOLIC BLOOD PRESSURE: 156 MMHG | HEART RATE: 64 BPM | OXYGEN SATURATION: 96 % | RESPIRATION RATE: 12 BRPM

## 2025-01-21 VITALS
HEART RATE: 60 BPM | WEIGHT: 111.99 LBS | SYSTOLIC BLOOD PRESSURE: 141 MMHG | DIASTOLIC BLOOD PRESSURE: 69 MMHG | OXYGEN SATURATION: 99 % | HEIGHT: 61 IN | TEMPERATURE: 98 F | RESPIRATION RATE: 16 BRPM

## 2025-01-21 DIAGNOSIS — Z90.89 ACQUIRED ABSENCE OF OTHER ORGANS: Chronic | ICD-10-CM

## 2025-01-21 DIAGNOSIS — Z98.890 OTHER SPECIFIED POSTPROCEDURAL STATES: Chronic | ICD-10-CM

## 2025-01-21 DIAGNOSIS — H26.9 UNSPECIFIED CATARACT: Chronic | ICD-10-CM

## 2025-01-21 PROCEDURE — 25607 OPTX DST RD XARTC FX/EPI SEP: CPT | Mod: LT

## 2025-01-21 DEVICE — PEG FIX SMOOTH LOKG 2X18MM: Type: IMPLANTABLE DEVICE | Site: LEFT | Status: FUNCTIONAL

## 2025-01-21 DEVICE — SCREW CORT LOKG 3.5X12MM: Type: IMPLANTABLE DEVICE | Site: LEFT | Status: FUNCTIONAL

## 2025-01-21 DEVICE — SCREW CORT LOKG 3.5X10MM: Type: IMPLANTABLE DEVICE | Site: LEFT | Status: FUNCTIONAL

## 2025-01-21 DEVICE — PEG FIX SMOOTH LOKG 2X16MM: Type: IMPLANTABLE DEVICE | Site: LEFT | Status: FUNCTIONAL

## 2025-01-21 DEVICE — PEG FIX SMOOTH LOKG 2X14MM: Type: IMPLANTABLE DEVICE | Site: LEFT | Status: FUNCTIONAL

## 2025-01-21 DEVICE — SCREW CORT NON LOKG 3.5X12MM: Type: IMPLANTABLE DEVICE | Site: LEFT | Status: FUNCTIONAL

## 2025-01-21 DEVICE — PLATE GEMINUS VOLAR DISTAL RADIUS NARR 3H LT: Type: IMPLANTABLE DEVICE | Site: LEFT | Status: FUNCTIONAL

## 2025-01-21 RX ORDER — ACETAMINOPHEN 80 MG/.8ML
650 SOLUTION/ DROPS ORAL ONCE
Refills: 0 | Status: DISCONTINUED | OUTPATIENT
Start: 2025-01-21 | End: 2025-01-21

## 2025-01-21 RX ORDER — ASPIRIN 81 MG
1 TABLET, DELAYED RELEASE (ENTERIC COATED) ORAL
Refills: 0 | DISCHARGE

## 2025-01-21 RX ORDER — ONDANSETRON 4 MG/1
4 TABLET ORAL ONCE
Refills: 0 | Status: DISCONTINUED | OUTPATIENT
Start: 2025-01-21 | End: 2025-01-21

## 2025-01-21 RX ORDER — GABAPENTIN 300 MG/1
0 CAPSULE ORAL
Refills: 0 | DISCHARGE

## 2025-01-21 RX ORDER — ESCITALOPRAM OXALATE 10 MG/1
1 TABLET ORAL
Refills: 0 | DISCHARGE

## 2025-01-21 RX ORDER — CHLORHEXIDINE GLUCONATE 1.2 MG/ML
1 RINSE ORAL ONCE
Refills: 0 | Status: COMPLETED | OUTPATIENT
Start: 2025-01-21 | End: 2025-01-21

## 2025-01-21 RX ORDER — OMEPRAZOLE MAGNESIUM 20 MG/1
1 CAPSULE, DELAYED RELEASE ORAL
Refills: 0 | DISCHARGE

## 2025-01-21 RX ORDER — SODIUM CHLORIDE 9 MG/ML
500 INJECTION, SOLUTION INTRAVENOUS
Refills: 0 | Status: DISCONTINUED | OUTPATIENT
Start: 2025-01-21 | End: 2025-01-21

## 2025-01-21 RX ORDER — LOSARTAN POTASSIUM 100 MG/1
1 TABLET, FILM COATED ORAL
Refills: 0 | DISCHARGE

## 2025-01-21 RX ORDER — THYROID,PORK 60 MG
1 TABLET ORAL
Refills: 0 | DISCHARGE

## 2025-01-21 NOTE — PRE-ANESTHESIA EVALUATION ADULT - NSANTHOSAYNRD_GEN_A_CORE
No. LIBERTAD screening performed.  STOP BANG Legend: 0-2 = LOW Risk; 3-4 = INTERMEDIATE Risk; 5-8 = HIGH Risk

## 2025-01-21 NOTE — PRE-OP CHECKLIST - ORDERS/MEDICATION ADMINISTRATION RECORD ON CHART
Pt called me back, he stated that at his last office visit he talked to [address] but was not sure if he was starting warfarin. He stated he thought he was to start Plavix. I told him that when he and I spoke on 8/16/23 I told him that Dr Elvin Hernandez was ok with him stopping Eliquis and starting Xarelto. I am not sure why it never went to the pharmacy. Pt stated he is really weak and short of breath. I told him I would send this to triage and have someone call him. done

## 2025-01-23 PROBLEM — C44.91 BASAL CELL CARCINOMA OF SKIN, UNSPECIFIED: Chronic | Status: ACTIVE | Noted: 2025-01-21

## 2025-01-23 PROBLEM — H54.8 LEGAL BLINDNESS, AS DEFINED IN USA: Chronic | Status: ACTIVE | Noted: 2025-01-17

## 2025-01-23 PROBLEM — E03.9 HYPOTHYROIDISM, UNSPECIFIED: Chronic | Status: ACTIVE | Noted: 2025-01-17

## 2025-01-23 PROBLEM — S39.92XA UNSPECIFIED INJURY OF LOWER BACK, INITIAL ENCOUNTER: Chronic | Status: ACTIVE | Noted: 2025-01-21

## 2025-01-31 ENCOUNTER — APPOINTMENT (OUTPATIENT)
Dept: ORTHOPEDIC SURGERY | Facility: CLINIC | Age: 85
End: 2025-01-31
Payer: MEDICARE

## 2025-01-31 DIAGNOSIS — S52.532A COLLES' FRACTURE OF LEFT RADIUS, INITIAL ENCOUNTER FOR CLOSED FRACTURE: ICD-10-CM

## 2025-01-31 PROCEDURE — 99024 POSTOP FOLLOW-UP VISIT: CPT

## 2025-01-31 PROCEDURE — 73110 X-RAY EXAM OF WRIST: CPT | Mod: LT

## 2025-02-03 ENCOUNTER — APPOINTMENT (OUTPATIENT)
Dept: ORTHOPEDIC SURGERY | Facility: CLINIC | Age: 85
End: 2025-02-03

## 2025-02-06 PROBLEM — K21.9 GASTRO-ESOPHAGEAL REFLUX DISEASE WITHOUT ESOPHAGITIS: Chronic | Status: ACTIVE | Noted: 2025-01-17

## 2025-02-06 PROBLEM — I25.10 ATHEROSCLEROTIC HEART DISEASE OF NATIVE CORONARY ARTERY WITHOUT ANGINA PECTORIS: Chronic | Status: ACTIVE | Noted: 2025-01-17

## 2025-02-06 PROBLEM — H40.9 UNSPECIFIED GLAUCOMA: Chronic | Status: ACTIVE | Noted: 2025-01-17

## 2025-02-06 PROBLEM — I10 ESSENTIAL (PRIMARY) HYPERTENSION: Chronic | Status: ACTIVE | Noted: 2025-01-17

## 2025-03-05 ENCOUNTER — APPOINTMENT (OUTPATIENT)
Dept: ORTHOPEDIC SURGERY | Facility: CLINIC | Age: 85
End: 2025-03-05

## 2025-03-07 ENCOUNTER — APPOINTMENT (OUTPATIENT)
Dept: ORTHOPEDIC SURGERY | Facility: CLINIC | Age: 85
End: 2025-03-07
Payer: MEDICARE

## 2025-03-07 DIAGNOSIS — S52.532A COLLES' FRACTURE OF LEFT RADIUS, INITIAL ENCOUNTER FOR CLOSED FRACTURE: ICD-10-CM

## 2025-03-07 PROCEDURE — 73110 X-RAY EXAM OF WRIST: CPT | Mod: LT

## 2025-03-07 PROCEDURE — 99024 POSTOP FOLLOW-UP VISIT: CPT

## (undated) DEVICE — DRSG KERLIX ROLL LG 4.5"

## (undated) DEVICE — GLV 8 PROTEXIS (WHITE)

## (undated) DEVICE — SUT VICRYL 4-0 18" P-3 UNDYED

## (undated) DEVICE — PACK HAND

## (undated) DEVICE — SUT ETHILON 5-0 18" P-3

## (undated) DEVICE — WARMING BLANKET LOWER ADULT

## (undated) DEVICE — MARKING PEN W RULER

## (undated) DEVICE — BLADE SCALPEL SAFETY #15 WITH PLASTIC GREEN HANDLE

## (undated) DEVICE — SUT VICRYL 4-0 18" PS-2 UNDYED

## (undated) DEVICE — DRAPE C ARM MINI PACK FOR 6800

## (undated) DEVICE — DRSG STERISTRIPS 0.25 X 3"

## (undated) DEVICE — TOURNIQUET CUFF 24" DUAL PORT SINGLE BLADDER W PLC (BLACK)

## (undated) DEVICE — TOURNIQUET CUFF 18" DUAL PORT SINGLE BLADDER W PLC  (BLACK)

## (undated) DEVICE — VENODYNE/SCD SLEEVE CALF MEDIUM